# Patient Record
Sex: MALE | Race: WHITE | NOT HISPANIC OR LATINO
[De-identification: names, ages, dates, MRNs, and addresses within clinical notes are randomized per-mention and may not be internally consistent; named-entity substitution may affect disease eponyms.]

---

## 2017-03-07 ENCOUNTER — APPOINTMENT (OUTPATIENT)
Dept: HEMATOLOGY ONCOLOGY | Facility: CLINIC | Age: 67
End: 2017-03-07

## 2017-03-07 VITALS
RESPIRATION RATE: 16 BRPM | BODY MASS INDEX: 31.92 KG/M2 | SYSTOLIC BLOOD PRESSURE: 167 MMHG | WEIGHT: 223 LBS | DIASTOLIC BLOOD PRESSURE: 91 MMHG | HEIGHT: 70 IN | OXYGEN SATURATION: 95 % | HEART RATE: 87 BPM

## 2017-03-07 DIAGNOSIS — Z86.39 PERSONAL HISTORY OF OTHER ENDOCRINE, NUTRITIONAL AND METABOLIC DISEASE: ICD-10-CM

## 2017-03-07 DIAGNOSIS — Z80.7 FAMILY HISTORY OF OTHER MALIGNANT NEOPLASMS OF LYMPHOID, HEMATOPOIETIC AND RELATED TISSUES: ICD-10-CM

## 2017-03-07 DIAGNOSIS — Z86.79 PERSONAL HISTORY OF OTHER DISEASES OF THE CIRCULATORY SYSTEM: ICD-10-CM

## 2017-03-07 RX ORDER — ATORVASTATIN CALCIUM 10 MG/1
10 TABLET, FILM COATED ORAL
Refills: 0 | Status: ACTIVE | COMMUNITY

## 2017-07-12 ENCOUNTER — APPOINTMENT (OUTPATIENT)
Dept: HEMATOLOGY ONCOLOGY | Facility: CLINIC | Age: 67
End: 2017-07-12

## 2017-07-12 VITALS
RESPIRATION RATE: 16 BRPM | HEART RATE: 90 BPM | BODY MASS INDEX: 32.21 KG/M2 | DIASTOLIC BLOOD PRESSURE: 79 MMHG | OXYGEN SATURATION: 95 % | SYSTOLIC BLOOD PRESSURE: 153 MMHG | TEMPERATURE: 99.1 F | WEIGHT: 225 LBS | HEIGHT: 70 IN

## 2017-07-12 DIAGNOSIS — Z00.00 ENCOUNTER FOR GENERAL ADULT MEDICAL EXAMINATION W/OUT ABNORMAL FINDINGS: ICD-10-CM

## 2017-07-12 RX ORDER — LEVOTHYROXINE SODIUM 0.09 MG/1
88 TABLET ORAL
Refills: 0 | Status: COMPLETED | COMMUNITY
End: 2017-07-12

## 2017-07-12 RX ORDER — LEVOTHYROXINE SODIUM 0.1 MG/1
100 TABLET ORAL
Refills: 0 | Status: ACTIVE | COMMUNITY

## 2017-12-12 ENCOUNTER — APPOINTMENT (OUTPATIENT)
Dept: HEMATOLOGY ONCOLOGY | Facility: CLINIC | Age: 67
End: 2017-12-12
Payer: COMMERCIAL

## 2017-12-12 VITALS
HEIGHT: 70 IN | WEIGHT: 220.99 LBS | HEART RATE: 84 BPM | TEMPERATURE: 98.6 F | SYSTOLIC BLOOD PRESSURE: 167 MMHG | BODY MASS INDEX: 31.64 KG/M2 | DIASTOLIC BLOOD PRESSURE: 91 MMHG | RESPIRATION RATE: 16 BRPM | OXYGEN SATURATION: 95 %

## 2017-12-12 PROCEDURE — 99214 OFFICE O/P EST MOD 30 MIN: CPT

## 2018-06-18 ENCOUNTER — APPOINTMENT (OUTPATIENT)
Dept: HEMATOLOGY ONCOLOGY | Facility: CLINIC | Age: 68
End: 2018-06-18
Payer: COMMERCIAL

## 2018-06-18 VITALS
OXYGEN SATURATION: 96 % | DIASTOLIC BLOOD PRESSURE: 80 MMHG | HEART RATE: 97 BPM | WEIGHT: 220.99 LBS | RESPIRATION RATE: 20 BRPM | TEMPERATURE: 98.1 F | SYSTOLIC BLOOD PRESSURE: 155 MMHG | HEIGHT: 70 IN | BODY MASS INDEX: 31.64 KG/M2

## 2018-06-18 PROCEDURE — 99214 OFFICE O/P EST MOD 30 MIN: CPT

## 2018-09-24 ENCOUNTER — RESULT REVIEW (OUTPATIENT)
Age: 68
End: 2018-09-24

## 2018-09-24 ENCOUNTER — APPOINTMENT (OUTPATIENT)
Dept: HEMATOLOGY ONCOLOGY | Facility: CLINIC | Age: 68
End: 2018-09-24
Payer: COMMERCIAL

## 2018-09-24 VITALS
DIASTOLIC BLOOD PRESSURE: 87 MMHG | HEIGHT: 70 IN | RESPIRATION RATE: 20 BRPM | WEIGHT: 218.99 LBS | TEMPERATURE: 98.1 F | SYSTOLIC BLOOD PRESSURE: 169 MMHG | HEART RATE: 88 BPM | OXYGEN SATURATION: 97 % | BODY MASS INDEX: 31.35 KG/M2

## 2018-09-24 PROCEDURE — 99213 OFFICE O/P EST LOW 20 MIN: CPT

## 2018-09-24 RX ORDER — MELATONIN 3 MG
25 MCG TABLET ORAL
Refills: 0 | Status: ACTIVE | COMMUNITY

## 2019-03-28 ENCOUNTER — APPOINTMENT (OUTPATIENT)
Dept: HEMATOLOGY ONCOLOGY | Facility: CLINIC | Age: 69
End: 2019-03-28
Payer: COMMERCIAL

## 2019-03-28 ENCOUNTER — RESULT REVIEW (OUTPATIENT)
Age: 69
End: 2019-03-28

## 2019-03-28 VITALS
HEIGHT: 70 IN | OXYGEN SATURATION: 96 % | TEMPERATURE: 99.2 F | WEIGHT: 221.98 LBS | BODY MASS INDEX: 31.78 KG/M2 | HEART RATE: 100 BPM | DIASTOLIC BLOOD PRESSURE: 100 MMHG | SYSTOLIC BLOOD PRESSURE: 179 MMHG | RESPIRATION RATE: 20 BRPM

## 2019-03-28 PROCEDURE — 99214 OFFICE O/P EST MOD 30 MIN: CPT

## 2019-03-28 RX ORDER — RAMIPRIL 10 MG/1
10 CAPSULE ORAL
Refills: 0 | Status: ACTIVE | COMMUNITY

## 2019-03-28 NOTE — HISTORY OF PRESENT ILLNESS
[de-identified] : 66 yo male - h/o DVT x 2- prothrombin gene mutation\par Chronic anticoagulation.\par B12 deficiency  [FreeTextEntry1] : Xarelto 10 mg, B12 1000 eod [de-identified] : Patient reports he feels well overall no complaints.  He continues on Xarelto 10 mg, B12 1000 eod.   he fractured his right ankle jogging on ice.  he was advised to increase his exercise by his cardiologist due to HTN, he also had his Ramipril increased to 10 mg.  He denies leg swelling redness.

## 2019-03-28 NOTE — REVIEW OF SYSTEMS
[Fatigue] : fatigue [Lower Ext Edema] : lower extremity edema [Constipation] : constipation [Negative] : Allergic/Immunologic [Eye Pain] : no eye pain [Dry Eyes] : no dryness of the eyes [Dysphagia] : no dysphagia [Hoarseness] : no hoarseness [Shortness Of Breath] : no shortness of breath [Cough] : no cough [Dysuria] : no dysuria [Joint Pain] : no joint pain [Joint Stiffness] : no joint stiffness [Skin Rash] : no skin rash [Skin Wound] : no skin wound [Dizziness] : no dizziness [Insomnia] : no insomnia [Anxiety] : no anxiety [Depression] : no depression [Muscle Weakness] : no muscle weakness [Easy Bleeding] : no tendency for easy bleeding [Easy Bruising] : no tendency for easy bruising [FreeTextEntry2] : improving with change in levothyroxine dose [FreeTextEntry5] : wears compression stockings [FreeTextEntry7] : occasional

## 2019-03-28 NOTE — REASON FOR VISIT
[Follow-Up Visit] : a follow-up visit for [FreeTextEntry2] :  67 yr old male f/u for hypercoag state and BL DVT x 5 yrs ago on Xarelto 20 mg.

## 2019-03-28 NOTE — CONSULT LETTER
[Dear  ___] : Dear  [unfilled], [Consult Letter:] : I had the pleasure of evaluating your patient, [unfilled]. [Please see my note below.] : Please see my note below. [Consult Closing:] : Thank you very much for allowing me to participate in the care of this patient.  If you have any questions, please do not hesitate to contact me. [Sincerely,] : Sincerely, [FreeTextEntry3] : Kacey Gee MD\par [facility] [place] at [facility]\par

## 2019-03-28 NOTE — ASSESSMENT
[FreeTextEntry1] : 68 yo male with H/O DVT,B12\par \par 1. DVT x 2 with PGM, chronic ac.  \par recent right fibula fx, slipped on ICE.\par -right calf edema- - check Doppler \par \par Patient commutes to [place] - on AC, change Xarelto to 10 mg as no clotting events. . \par Anemia - decline in Hg from 15 to 13.9, check Fe stores. \par \par 2. B12 deficiency - on B12 1000 mcg every other day - Vit B12 776,continue.\par \par 3. Family h/o hemochromatosis - genetic testing, negative for mutation.\par \par - follow up 6 m\par \par \par

## 2019-09-20 NOTE — PHYSICAL EXAM
Detail Level: Zone [Fully active, able to carry on all pre-disease performance without restriction] : Status 0 - Fully active, able to carry on all pre-disease performance without restriction [Normal] : affect appropriate

## 2019-09-26 ENCOUNTER — RESULT REVIEW (OUTPATIENT)
Age: 69
End: 2019-09-26

## 2019-09-26 ENCOUNTER — APPOINTMENT (OUTPATIENT)
Dept: HEMATOLOGY ONCOLOGY | Facility: CLINIC | Age: 69
End: 2019-09-26
Payer: COMMERCIAL

## 2019-09-26 VITALS
HEART RATE: 73 BPM | SYSTOLIC BLOOD PRESSURE: 160 MMHG | BODY MASS INDEX: 32.07 KG/M2 | RESPIRATION RATE: 20 BRPM | HEIGHT: 70 IN | WEIGHT: 223.99 LBS | OXYGEN SATURATION: 98 % | DIASTOLIC BLOOD PRESSURE: 84 MMHG | TEMPERATURE: 98 F

## 2019-09-26 DIAGNOSIS — R60.0 LOCALIZED EDEMA: ICD-10-CM

## 2019-09-26 PROCEDURE — 99214 OFFICE O/P EST MOD 30 MIN: CPT

## 2019-09-26 RX ORDER — AMLODIPINE BESYLATE 10 MG/1
10 TABLET ORAL
Refills: 0 | Status: ACTIVE | COMMUNITY

## 2019-09-26 RX ORDER — TADALAFIL 20 MG/1
20 TABLET, FILM COATED ORAL
Refills: 0 | Status: COMPLETED | COMMUNITY
End: 2019-09-26

## 2019-09-26 RX ORDER — SILDENAFIL CITRATE 50 MG/1
50 TABLET, FILM COATED ORAL
Refills: 0 | Status: ACTIVE | COMMUNITY

## 2019-09-26 NOTE — CONSULT LETTER
[Dear  ___] : Dear  [unfilled], [Consult Letter:] : I had the pleasure of evaluating your patient, [unfilled]. [Consult Closing:] : Thank you very much for allowing me to participate in the care of this patient.  If you have any questions, please do not hesitate to contact me. [Please see my note below.] : Please see my note below. [Sincerely,] : Sincerely, [FreeTextEntry3] : Kacey Gee MD\par [facility] [place] at [facility]\par

## 2019-09-26 NOTE — HISTORY OF PRESENT ILLNESS
[de-identified] : 66 yo male - h/o DVT x 2- prothrombin gene mutation\par Chronic anticoagulation.\par B12 deficiency  [FreeTextEntry1] : Xarelto 10 mg, B12 1000 eod [de-identified] : Patient reports he feels well overall no complaints.  He continues on Xarelto 10 mg.  Right ankle fracture has healed well.

## 2019-09-26 NOTE — REVIEW OF SYSTEMS
[Fatigue] : fatigue [Lower Ext Edema] : lower extremity edema [Negative] : Heme/Lymph [Eye Pain] : no eye pain [Dry Eyes] : no dryness of the eyes [Dysphagia] : no dysphagia [Hoarseness] : no hoarseness [Shortness Of Breath] : no shortness of breath [Cough] : no cough [Dysuria] : no dysuria [Joint Pain] : no joint pain [Joint Stiffness] : no joint stiffness [Skin Rash] : no skin rash [Skin Wound] : no skin wound [Dizziness] : no dizziness [Insomnia] : no insomnia [Anxiety] : no anxiety [Depression] : no depression [Muscle Weakness] : no muscle weakness [Easy Bleeding] : no tendency for easy bleeding [Easy Bruising] : no tendency for easy bruising [FreeTextEntry5] : wears compression stockings

## 2019-09-26 NOTE — ASSESSMENT
[FreeTextEntry1] : 66 yo male with H/O DVT,B12\par \par 1. DVT x 2 with PGM, chronic ac.  \par  Patient commutes to [place] - on AC, change Xarelto to 10 mg as no clotting events. . \par Anemia- Hg 15- stable now \par \par 2. B12 deficiency - on B12 1000 mcg every other day - Vit B12 776, continue.\par \par 3. Family h/o hemochromatosis - genetic testing, negative for mutation.\par \par - follow up 6 m\par \par \par

## 2020-03-25 ENCOUNTER — APPOINTMENT (OUTPATIENT)
Dept: HEMATOLOGY ONCOLOGY | Facility: CLINIC | Age: 70
End: 2020-03-25

## 2020-07-15 ENCOUNTER — RESULT REVIEW (OUTPATIENT)
Age: 70
End: 2020-07-15

## 2020-07-15 ENCOUNTER — APPOINTMENT (OUTPATIENT)
Dept: HEMATOLOGY ONCOLOGY | Facility: CLINIC | Age: 70
End: 2020-07-15
Payer: COMMERCIAL

## 2020-07-15 VITALS
WEIGHT: 224 LBS | BODY MASS INDEX: 32.07 KG/M2 | DIASTOLIC BLOOD PRESSURE: 76 MMHG | RESPIRATION RATE: 18 BRPM | HEIGHT: 70 IN | OXYGEN SATURATION: 96 % | TEMPERATURE: 97.2 F | SYSTOLIC BLOOD PRESSURE: 141 MMHG | HEART RATE: 99 BPM

## 2020-07-15 PROCEDURE — 99214 OFFICE O/P EST MOD 30 MIN: CPT

## 2020-07-15 NOTE — HISTORY OF PRESENT ILLNESS
[de-identified] : 66 yo male - h/o DVT x 2- prothrombin gene mutation\par Chronic anticoagulation.\par B12 deficiency  [FreeTextEntry1] : Xarelto 10 mg, B12 1000 eod [de-identified] : Patient reports for follow up of hypercoag state- s/p DVT- remains on maintenance Xarelto 10mg po qd- tolerating well, no evidence of bleeding.

## 2020-07-15 NOTE — CONSULT LETTER
[Dear  ___] : Dear  [unfilled], [Please see my note below.] : Please see my note below. [Consult Closing:] : Thank you very much for allowing me to participate in the care of this patient.  If you have any questions, please do not hesitate to contact me. [Consult Letter:] : I had the pleasure of evaluating your patient, [unfilled]. [Sincerely,] : Sincerely, [FreeTextEntry3] : Kacey eGe MD\par [facility] [place] at [facility]\par

## 2020-07-15 NOTE — ASSESSMENT
[FreeTextEntry1] : 69 yo male with H/O DVT, B12\par \par 1. DVT x 2 with PGM, chronic ac.  \par  Patient commutes to [place] - on AC, change Xarelto to 10 mg as no clotting events. . \par Anemia- Hg 15- stable now \par \par 2. B12 deficiency - on B12 1000 mcg every other day - Vit B12 776, continue.\par \par 3. Family h/o hemochromatosis - genetic testing, negative for mutation.\par \par - follow up 12 m\par \par \par \par

## 2020-07-15 NOTE — REVIEW OF SYSTEMS
[Fatigue] : fatigue [Negative] : Allergic/Immunologic [Eye Pain] : no eye pain [Dry Eyes] : no dryness of the eyes [Hoarseness] : no hoarseness [Dysphagia] : no dysphagia [Dysuria] : no dysuria [Cough] : no cough [Shortness Of Breath] : no shortness of breath [Lower Ext Edema] : no lower extremity edema [Joint Stiffness] : no joint stiffness [Skin Rash] : no skin rash [Joint Pain] : no joint pain [Skin Wound] : no skin wound [Dizziness] : no dizziness [Insomnia] : no insomnia [Muscle Weakness] : no muscle weakness [Anxiety] : no anxiety [Depression] : no depression [Easy Bruising] : no tendency for easy bruising [Easy Bleeding] : no tendency for easy bleeding

## 2021-07-15 ENCOUNTER — APPOINTMENT (OUTPATIENT)
Dept: HEMATOLOGY ONCOLOGY | Facility: CLINIC | Age: 71
End: 2021-07-15
Payer: COMMERCIAL

## 2021-07-15 ENCOUNTER — RESULT REVIEW (OUTPATIENT)
Age: 71
End: 2021-07-15

## 2021-07-15 VITALS
SYSTOLIC BLOOD PRESSURE: 163 MMHG | HEART RATE: 97 BPM | BODY MASS INDEX: 31.78 KG/M2 | DIASTOLIC BLOOD PRESSURE: 82 MMHG | OXYGEN SATURATION: 96 % | HEIGHT: 70 IN | TEMPERATURE: 97.9 F | RESPIRATION RATE: 18 BRPM | WEIGHT: 221.98 LBS

## 2021-07-15 PROCEDURE — 99072 ADDL SUPL MATRL&STAF TM PHE: CPT

## 2021-07-15 PROCEDURE — 99214 OFFICE O/P EST MOD 30 MIN: CPT

## 2021-07-15 NOTE — ASSESSMENT
[FreeTextEntry1] : 72 yo male with H/O DVT, B12\par \par Patient vaccinated for Covid \par \par 1. DVT x 2 with PGM, chronic ac.  \par  Patient commutes to [place] - on AC, change Xarelto to 10 mg as no clotting events. . \par Anemia- Hg 15- stable now \par \par 2. B12 deficiency - on B12 1000 mcg every other day - Vit B12 776, continue.\par \par 3. Family h/o hemochromatosis - genetic testing, negative for mutation.\par \par - follow up 12 m\par \par \par \par

## 2021-07-15 NOTE — HISTORY OF PRESENT ILLNESS
[de-identified] : 68 yo male - h/o DVT x 2- prothrombin gene mutation\par Chronic anticoagulation.\par B12 deficiency  [FreeTextEntry1] : Xarelto 10 mg, B12 1000 eod [de-identified] : Patient reports for follow up of hypercoag state- s/p DVT- remains on maintenance Xarelto 10mg po qd- tolerating well, no evidence of bleeding.

## 2021-07-15 NOTE — REVIEW OF SYSTEMS
[Fatigue] : fatigue [Negative] : Allergic/Immunologic [Eye Pain] : no eye pain [Dry Eyes] : no dryness of the eyes [Dysphagia] : no dysphagia [Hoarseness] : no hoarseness [Lower Ext Edema] : no lower extremity edema [Shortness Of Breath] : no shortness of breath [Cough] : no cough [Dysuria] : no dysuria [Joint Pain] : no joint pain [Joint Stiffness] : no joint stiffness [Skin Rash] : no skin rash [Skin Wound] : no skin wound [Dizziness] : no dizziness [Insomnia] : no insomnia [Anxiety] : no anxiety [Depression] : no depression [Muscle Weakness] : no muscle weakness [Easy Bleeding] : no tendency for easy bleeding [Easy Bruising] : no tendency for easy bruising

## 2021-07-15 NOTE — CONSULT LETTER
[Dear  ___] : Dear  [unfilled], [Consult Letter:] : I had the pleasure of evaluating your patient, [unfilled]. [Please see my note below.] : Please see my note below. [Consult Closing:] : Thank you very much for allowing me to participate in the care of this patient.  If you have any questions, please do not hesitate to contact me. [Sincerely,] : Sincerely, [FreeTextEntry3] : Kacey Gee MD\par [facility] Cancer [place] at [facility]\par

## 2022-07-14 ENCOUNTER — RESULT REVIEW (OUTPATIENT)
Age: 72
End: 2022-07-14

## 2022-07-14 ENCOUNTER — APPOINTMENT (OUTPATIENT)
Dept: HEMATOLOGY ONCOLOGY | Facility: CLINIC | Age: 72
End: 2022-07-14

## 2022-07-14 VITALS
OXYGEN SATURATION: 96 % | WEIGHT: 224.44 LBS | BODY MASS INDEX: 32.13 KG/M2 | SYSTOLIC BLOOD PRESSURE: 145 MMHG | HEART RATE: 92 BPM | TEMPERATURE: 97 F | HEIGHT: 70 IN | RESPIRATION RATE: 18 BRPM | DIASTOLIC BLOOD PRESSURE: 87 MMHG

## 2022-07-14 DIAGNOSIS — E55.9 VITAMIN D DEFICIENCY, UNSPECIFIED: ICD-10-CM

## 2022-07-14 DIAGNOSIS — D51.3 OTHER DIETARY VITAMIN B12 DEFICIENCY ANEMIA: ICD-10-CM

## 2022-07-14 PROCEDURE — 99214 OFFICE O/P EST MOD 30 MIN: CPT | Mod: 25

## 2022-07-14 PROCEDURE — 36415 COLL VENOUS BLD VENIPUNCTURE: CPT

## 2022-07-14 RX ORDER — METFORMIN HYDROCHLORIDE 500 MG/1
500 TABLET, COATED ORAL DAILY
Refills: 0 | Status: ACTIVE | COMMUNITY

## 2022-07-14 RX ORDER — AMOXICILLIN 875 MG/1
875 TABLET, FILM COATED ORAL
Qty: 14 | Refills: 0 | Status: COMPLETED | COMMUNITY
Start: 2022-05-25

## 2022-07-14 RX ORDER — BETAMETHASONE DIPROPIONATE 0.5 MG/G
0.05 OINTMENT TOPICAL
Qty: 45 | Refills: 0 | Status: COMPLETED | COMMUNITY
Start: 2022-06-07

## 2022-07-14 NOTE — REVIEW OF SYSTEMS
[Negative] : Gastrointestinal [Fatigue] : no fatigue [Eye Pain] : no eye pain [Dry Eyes] : no dryness of the eyes [Dysphagia] : no dysphagia [Hoarseness] : no hoarseness [Lower Ext Edema] : no lower extremity edema [Shortness Of Breath] : no shortness of breath [Cough] : no cough [Dysuria] : no dysuria [Joint Pain] : no joint pain [Joint Stiffness] : no joint stiffness [Skin Rash] : no skin rash [Skin Wound] : no skin wound [Dizziness] : no dizziness [Insomnia] : no insomnia [Anxiety] : no anxiety [Depression] : no depression [Muscle Weakness] : no muscle weakness [Easy Bleeding] : no tendency for easy bleeding [Easy Bruising] : no tendency for easy bruising

## 2022-07-14 NOTE — ASSESSMENT
[FreeTextEntry1] : 73 yo male with H/O DVT, B12\par \par Patient vaccinated for Covid \par \par #. DVT x 2 with PGM, chronic ac.  \par  Patient commutes to [place] - on AC, change Xarelto to 10 mg as no clotting events. . \par Anemia- Hg 13.6- stable now \par \par # B12 deficiency - on B12 1000 mcg every other day - Vit B12 858, continue.\par \par #. Family h/o hemochromatosis - genetic testing, negative for mutation.\par \par # vit D deficiency\par vit D 2 k per day\par - follow up 12 m\par \par \par \par

## 2022-07-14 NOTE — CONSULT LETTER
[Dear  ___] : Dear  [unfilled], [Consult Letter:] : I had the pleasure of evaluating your patient, [unfilled]. [Please see my note below.] : Please see my note below. [Consult Closing:] : Thank you very much for allowing me to participate in the care of this patient.  If you have any questions, please do not hesitate to contact me. [Sincerely,] : Sincerely, [FreeTextEntry3] : Kacey Gee MD\par [facility] Cancer [place] at [facility]\par

## 2022-07-14 NOTE — HISTORY OF PRESENT ILLNESS
[de-identified] : 68 yo male - h/o DVT x 2- prothrombin gene mutation\par Chronic anticoagulation.\par B12 deficiency  [FreeTextEntry1] : Xarelto 10 mg, B12 1000 eod [de-identified] : Patient reports for follow up of hypercoag state- s/p DVT- remains on maintenance Xarelto 10mg po qd- \par He has no complaints at this time; patient denies any bleeding issues over the last year and is tolerating the medication well. \par He has had follow ups with his cardiologist with [facility] and endocrinologist

## 2023-08-04 ENCOUNTER — APPOINTMENT (OUTPATIENT)
Dept: HEMATOLOGY ONCOLOGY | Facility: CLINIC | Age: 73
End: 2023-08-04
Payer: MEDICARE

## 2023-08-04 ENCOUNTER — RESULT REVIEW (OUTPATIENT)
Age: 73
End: 2023-08-04

## 2023-08-04 VITALS
WEIGHT: 221 LBS | OXYGEN SATURATION: 96 % | BODY MASS INDEX: 31.64 KG/M2 | HEART RATE: 92 BPM | DIASTOLIC BLOOD PRESSURE: 72 MMHG | TEMPERATURE: 97.5 F | HEIGHT: 70 IN | SYSTOLIC BLOOD PRESSURE: 134 MMHG | RESPIRATION RATE: 16 BRPM

## 2023-08-04 DIAGNOSIS — D63.8 ANEMIA IN OTHER CHRONIC DISEASES CLASSIFIED ELSEWHERE: ICD-10-CM

## 2023-08-04 DIAGNOSIS — I10 ESSENTIAL (PRIMARY) HYPERTENSION: ICD-10-CM

## 2023-08-04 DIAGNOSIS — D68.59 OTHER PRIMARY THROMBOPHILIA: ICD-10-CM

## 2023-08-04 DIAGNOSIS — I82.409 ACUTE EMBOLISM AND THROMBOSIS OF UNSPECIFIED DEEP VEINS OF UNSPECIFIED LOWER EXTREMITY: ICD-10-CM

## 2023-08-04 DIAGNOSIS — I82.502 CHRONIC EMBOLISM AND THROMBOSIS OF UNSPECIFIED DEEP VEINS OF LEFT LOWER EXTREMITY: ICD-10-CM

## 2023-08-04 PROCEDURE — 99213 OFFICE O/P EST LOW 20 MIN: CPT | Mod: 25

## 2023-08-04 PROCEDURE — 36415 COLL VENOUS BLD VENIPUNCTURE: CPT

## 2023-08-04 RX ORDER — SEMAGLUTIDE 1.34 MG/ML
2 INJECTION, SOLUTION SUBCUTANEOUS
Refills: 0 | Status: ACTIVE | COMMUNITY

## 2023-08-04 RX ORDER — RIVAROXABAN 10 MG/1
10 TABLET, FILM COATED ORAL
Qty: 90 | Refills: 3 | Status: ACTIVE | COMMUNITY
Start: 1900-01-01 | End: 1900-01-01

## 2023-08-04 NOTE — CONSULT LETTER
[Dear  ___] : Dear  [unfilled], [Consult Letter:] : I had the pleasure of evaluating your patient, [unfilled]. [Please see my note below.] : Please see my note below. [Consult Closing:] : Thank you very much for allowing me to participate in the care of this patient.  If you have any questions, please do not hesitate to contact me. [Sincerely,] : Sincerely, [FreeTextEntry3] : Kacey Gee MD\par [facility] Cancer [place] at [facility]\par

## 2023-08-04 NOTE — ASSESSMENT
[FreeTextEntry1] : 72 yo male with H/O DVT, B12  Patient vaccinated for Covid   #. DVT x 2 with PGM, chronic ac.    Patient commutes to [place] - on AC, change Xarelto to 10 mg as no clotting events. .  Anemia- Hg 13.7- stable now  Last colonscopy few years ago.    # B12 deficiency - on B12 1000 mcg every other day - Vit B12 795, continue.  #. Family h/o hemochromatosis - genetic testing, negative for mutation.  # vit D deficiency vit D 2 k per day  Case and mgtm discussed with Dr. Gee- return in 1 year- Reg, d-dimer, irons, b12

## 2023-08-04 NOTE — HISTORY OF PRESENT ILLNESS
[de-identified] : 66 yo male - h/o DVT x 2- prothrombin gene mutation\par Chronic anticoagulation.\par B12 deficiency  [FreeTextEntry1] : Xarelto 10 mg, B12 1000 eod [de-identified] : Patient reports for follow up of hypercoag state- s/p DVT- remains on maintenance Xarelto 10mg po qd-  He has no complaints at this time; patient denies any bleeding issues over the last year and is tolerating the medication well.  He has had follow ups with his cardiologist with new leaky valve and murmur.  No need for intervention right now.

## 2023-08-04 NOTE — REVIEW OF SYSTEMS
[Negative] : Allergic/Immunologic [Fatigue] : no fatigue [Eye Pain] : no eye pain [Dry Eyes] : no dryness of the eyes [Dysphagia] : no dysphagia [Hoarseness] : no hoarseness [Lower Ext Edema] : no lower extremity edema [Shortness Of Breath] : no shortness of breath [Cough] : no cough [Dysuria] : no dysuria [Joint Pain] : no joint pain [Joint Stiffness] : no joint stiffness [Skin Rash] : no skin rash [Skin Wound] : no skin wound [Dizziness] : no dizziness [Insomnia] : no insomnia [Anxiety] : no anxiety [Depression] : no depression [Muscle Weakness] : no muscle weakness [Easy Bleeding] : no tendency for easy bleeding [Easy Bruising] : no tendency for easy bruising

## 2024-08-05 ENCOUNTER — APPOINTMENT (OUTPATIENT)
Dept: HEMATOLOGY ONCOLOGY | Facility: CLINIC | Age: 74
End: 2024-08-05

## 2024-08-05 ENCOUNTER — RESULT REVIEW (OUTPATIENT)
Age: 74
End: 2024-08-05

## 2024-08-05 PROCEDURE — 99213 OFFICE O/P EST LOW 20 MIN: CPT | Mod: 25

## 2024-08-05 PROCEDURE — 36415 COLL VENOUS BLD VENIPUNCTURE: CPT

## 2024-08-05 NOTE — REVIEW OF SYSTEMS
[Negative] : Allergic/Immunologic [Fatigue] : fatigue [Eye Pain] : no eye pain [Dry Eyes] : no dryness of the eyes [Dysphagia] : no dysphagia [Hoarseness] : no hoarseness [Lower Ext Edema] : no lower extremity edema [Shortness Of Breath] : no shortness of breath [Cough] : no cough [Dysuria] : no dysuria [Joint Stiffness] : no joint stiffness [Joint Pain] : no joint pain [Skin Rash] : no skin rash [Dizziness] : no dizziness [Skin Wound] : no skin wound [Insomnia] : no insomnia [Anxiety] : no anxiety [Depression] : no depression [Muscle Weakness] : no muscle weakness [Easy Bleeding] : no tendency for easy bleeding [Easy Bruising] : no tendency for easy bruising

## 2024-08-05 NOTE — HISTORY OF PRESENT ILLNESS
[de-identified] : 68 yo male - h/o DVT x 2- prothrombin gene mutation\par  Chronic anticoagulation.\par  B12 deficiency  [FreeTextEntry1] : Xarelto 10 mg, B12 1000 eod [de-identified] : Patient reports for follow up of hypercoag state- s/p DVT- remains on maintenance Xarelto 10mg po qd-  He has no complaints at this time; patient denies any bleeding issues over the last year and is tolerating the medication well.  S/p valve replacement Nov 2023

## 2024-08-05 NOTE — CONSULT LETTER
[Dear  ___] : Dear  [unfilled], [Consult Letter:] : I had the pleasure of evaluating your patient, [unfilled]. [Please see my note below.] : Please see my note below. [Consult Closing:] : Thank you very much for allowing me to participate in the care of this patient.  If you have any questions, please do not hesitate to contact me. [Sincerely,] : Sincerely, [FreeTextEntry3] : Kacey Gee MD\par  [facility] [place] at [facility]\par

## 2024-08-05 NOTE — HISTORY OF PRESENT ILLNESS
[de-identified] : 68 yo male - h/o DVT x 2- prothrombin gene mutation\par  Chronic anticoagulation.\par  B12 deficiency  [FreeTextEntry1] : Xarelto 10 mg, B12 1000 eod [de-identified] : Patient reports for follow up of hypercoag state- s/p DVT- remains on maintenance Xarelto 10mg po qd-  He has no complaints at this time; patient denies any bleeding issues over the last year and is tolerating the medication well.  S/p valve replacement Nov 2023

## 2024-08-05 NOTE — ASSESSMENT
[FreeTextEntry1] : 73 yo male with H/O DVT, B12  Patient vaccinated for Covid   #. DVT x 2 with PGM, chronic ac.    Patient commutes to [place] - on AC, change Xarelto to 10 mg as no clotting events. .  Anemia- Hg 13.7- stable  Last colonoscopy few years ago.    # s/p aortic valve replacement Nov 2023- [facility] on baby ASA  # B12 deficiency - on B12 1000 mcg every other day - Vit B12 795, continue.  #. Family h/o hemochromatosis - genetic testing, negative for mutation.  # vit D deficiency vit D 2 k per day  Case and mgtm discussed with Dr. Gee- return in 1 year- Reg, d-dimer, irons, b12

## 2024-08-05 NOTE — CONSULT LETTER
[Dear  ___] : Dear  [unfilled], [Consult Letter:] : I had the pleasure of evaluating your patient, [unfilled]. [Please see my note below.] : Please see my note below. [Consult Closing:] : Thank you very much for allowing me to participate in the care of this patient.  If you have any questions, please do not hesitate to contact me. [Sincerely,] : Sincerely, [FreeTextEntry3] : Kacey Gee MD\par  [facility] Cancer [place] at [facility]\par

## 2024-08-05 NOTE — REVIEW OF SYSTEMS
[Negative] : Allergic/Immunologic [Fatigue] : fatigue [Eye Pain] : no eye pain [Dry Eyes] : no dryness of the eyes [Dysphagia] : no dysphagia [Hoarseness] : no hoarseness [Lower Ext Edema] : no lower extremity edema [Shortness Of Breath] : no shortness of breath [Cough] : no cough [Dysuria] : no dysuria [Joint Stiffness] : no joint stiffness [Joint Pain] : no joint pain [Skin Rash] : no skin rash [Skin Wound] : no skin wound [Dizziness] : no dizziness [Insomnia] : no insomnia [Anxiety] : no anxiety [Depression] : no depression [Muscle Weakness] : no muscle weakness [Easy Bleeding] : no tendency for easy bleeding [Easy Bruising] : no tendency for easy bruising

## 2024-08-05 NOTE — ASSESSMENT
[FreeTextEntry1] : 75 yo male with H/O DVT, B12  Patient vaccinated for Covid   #. DVT x 2 with PGM, chronic ac.    Patient commutes to [place] - on AC, change Xarelto to 10 mg as no clotting events. .  Anemia- Hg 13.7- stable  Last colonoscopy few years ago.    # s/p aortic valve replacement Nov 2023- [facility] on baby ASA  # B12 deficiency - on B12 1000 mcg every other day - Vit B12 795, continue.  #. Family h/o hemochromatosis - genetic testing, negative for mutation.  # vit D deficiency vit D 2 k per day  Case and mgtm discussed with Dr. Gee- return in 1 year- Reg, d-dimer, irons, b12

## 2025-08-05 ENCOUNTER — APPOINTMENT (OUTPATIENT)
Dept: HEMATOLOGY ONCOLOGY | Facility: CLINIC | Age: 75
End: 2025-08-05

## 2025-08-05 RX ORDER — COLD-HOT PACK
EACH MISCELLANEOUS DAILY
Refills: 0 | Status: ACTIVE | COMMUNITY
Start: 2025-08-05

## 2025-08-09 ENCOUNTER — NON-APPOINTMENT (OUTPATIENT)
Age: 75
End: 2025-08-09

## 2025-08-11 ENCOUNTER — RESULT REVIEW (OUTPATIENT)
Age: 75
End: 2025-08-11

## 2025-08-11 ENCOUNTER — APPOINTMENT (OUTPATIENT)
Dept: HEMATOLOGY ONCOLOGY | Facility: CLINIC | Age: 75
End: 2025-08-11
Payer: MEDICARE

## 2025-08-11 VITALS
DIASTOLIC BLOOD PRESSURE: 69 MMHG | HEART RATE: 82 BPM | OXYGEN SATURATION: 98 % | RESPIRATION RATE: 16 BRPM | WEIGHT: 211.25 LBS | TEMPERATURE: 97.1 F | SYSTOLIC BLOOD PRESSURE: 130 MMHG | HEIGHT: 70 IN | BODY MASS INDEX: 30.24 KG/M2

## 2025-08-11 DIAGNOSIS — I82.409 ACUTE EMBOLISM AND THROMBOSIS OF UNSPECIFIED DEEP VEINS OF UNSPECIFIED LOWER EXTREMITY: ICD-10-CM

## 2025-08-11 DIAGNOSIS — D68.59 OTHER PRIMARY THROMBOPHILIA: ICD-10-CM

## 2025-08-11 DIAGNOSIS — D63.8 ANEMIA IN OTHER CHRONIC DISEASES CLASSIFIED ELSEWHERE: ICD-10-CM

## 2025-08-11 DIAGNOSIS — I10 ESSENTIAL (PRIMARY) HYPERTENSION: ICD-10-CM

## 2025-08-11 PROCEDURE — 99213 OFFICE O/P EST LOW 20 MIN: CPT

## 2025-08-11 RX ORDER — METHYLPREDNISOLONE 4 MG/1
4 TABLET ORAL
Qty: 21 | Refills: 0 | Status: COMPLETED | COMMUNITY
Start: 2025-03-24

## 2025-08-11 RX ORDER — TAMSULOSIN HYDROCHLORIDE 0.4 MG/1
0.4 CAPSULE ORAL
Qty: 90 | Refills: 2 | Status: ACTIVE | COMMUNITY
Start: 2025-08-11